# Patient Record
(demographics unavailable — no encounter records)

---

## 2025-05-13 NOTE — REASON FOR VISIT
[Initial] : an initial visit [Abnormal CXR/ Chest CT] : an abnormal CXR/ chest CT [Asthma] : asthma [Pulmonary Nodules] : pulmonary nodules